# Patient Record
Sex: FEMALE | Race: ASIAN
[De-identification: names, ages, dates, MRNs, and addresses within clinical notes are randomized per-mention and may not be internally consistent; named-entity substitution may affect disease eponyms.]

---

## 2023-01-04 ENCOUNTER — HOSPITAL ENCOUNTER (INPATIENT)
Dept: HOSPITAL 4 - SED | Age: 88
LOS: 2 days | Discharge: NURSING, REHAB OR LONG-TERM CARE | DRG: 640 | End: 2023-01-06
Attending: INTERNAL MEDICINE | Admitting: INTERNAL MEDICINE
Payer: COMMERCIAL

## 2023-01-04 VITALS — HEIGHT: 64 IN | WEIGHT: 104 LBS | BODY MASS INDEX: 17.75 KG/M2

## 2023-01-04 VITALS — SYSTOLIC BLOOD PRESSURE: 168 MMHG

## 2023-01-04 VITALS — SYSTOLIC BLOOD PRESSURE: 177 MMHG

## 2023-01-04 VITALS — SYSTOLIC BLOOD PRESSURE: 160 MMHG

## 2023-01-04 DIAGNOSIS — D63.8: ICD-10-CM

## 2023-01-04 DIAGNOSIS — Z79.82: ICD-10-CM

## 2023-01-04 DIAGNOSIS — E11.51: ICD-10-CM

## 2023-01-04 DIAGNOSIS — E03.9: ICD-10-CM

## 2023-01-04 DIAGNOSIS — I10: ICD-10-CM

## 2023-01-04 DIAGNOSIS — E11.42: ICD-10-CM

## 2023-01-04 DIAGNOSIS — E78.00: ICD-10-CM

## 2023-01-04 DIAGNOSIS — M47.816: ICD-10-CM

## 2023-01-04 DIAGNOSIS — K80.20: ICD-10-CM

## 2023-01-04 DIAGNOSIS — N39.0: ICD-10-CM

## 2023-01-04 DIAGNOSIS — D64.9: ICD-10-CM

## 2023-01-04 DIAGNOSIS — N17.0: ICD-10-CM

## 2023-01-04 DIAGNOSIS — G93.41: ICD-10-CM

## 2023-01-04 DIAGNOSIS — M19.90: ICD-10-CM

## 2023-01-04 DIAGNOSIS — K21.9: ICD-10-CM

## 2023-01-04 DIAGNOSIS — E86.0: Primary | ICD-10-CM

## 2023-01-04 DIAGNOSIS — E87.1: ICD-10-CM

## 2023-01-04 DIAGNOSIS — Z79.1: ICD-10-CM

## 2023-01-04 DIAGNOSIS — R26.81: ICD-10-CM

## 2023-01-04 DIAGNOSIS — I25.10: ICD-10-CM

## 2023-01-04 DIAGNOSIS — Z20.822: ICD-10-CM

## 2023-01-04 DIAGNOSIS — Z79.899: ICD-10-CM

## 2023-01-04 DIAGNOSIS — E44.0: ICD-10-CM

## 2023-01-04 LAB
ALBUMIN SERPL BCP-MCNC: 2.8 G/DL (ref 3.4–4.8)
ALT SERPL W P-5'-P-CCNC: 121 U/L (ref 12–78)
ANION GAP SERPL CALCULATED.3IONS-SCNC: 9 MMOL/L (ref 5–15)
APPEARANCE UR: (no result)
AST SERPL W P-5'-P-CCNC: 62 U/L (ref 10–37)
BACTERIA URNS QL MICRO: (no result) /HPF
BASOPHILS # BLD AUTO: 0.1 K/UL (ref 0–0.2)
BASOPHILS NFR BLD AUTO: 1.2 % (ref 0–2)
BILIRUB SERPL-MCNC: 0.3 MG/DL (ref 0–1)
BILIRUB UR QL STRIP: NEGATIVE
BUN SERPL-MCNC: 26 MG/DL (ref 8–21)
CALCIUM SERPL-MCNC: 8.7 MG/DL (ref 8.4–11)
CHLORIDE SERPL-SCNC: 97 MMOL/L (ref 98–107)
COLOR UR: YELLOW
CREAT SERPL-MCNC: 1.21 MG/DL (ref 0.55–1.3)
EOSINOPHIL # BLD AUTO: 0.1 K/UL (ref 0–0.4)
EOSINOPHIL NFR BLD AUTO: 0.9 % (ref 0–4)
ERYTHROCYTE [DISTWIDTH] IN BLOOD BY AUTOMATED COUNT: 16.6 % (ref 9–15)
GFR SERPL CREATININE-BSD FRML MDRD: (no result) ML/MIN (ref 90–?)
GLUCOSE SERPL-MCNC: 154 MG/DL (ref 70–99)
GLUCOSE UR STRIP-MCNC: NEGATIVE MG/DL
HCT VFR BLD AUTO: 33.9 % (ref 36–48)
HGB BLD-MCNC: 11.4 G/DL (ref 12–16)
HGB UR QL STRIP: (no result)
KETONES UR STRIP-MCNC: NEGATIVE MG/DL
LEUKOCYTE ESTERASE UR QL STRIP: (no result)
LYMPHOCYTES # BLD AUTO: 2.6 K/UL (ref 1–5.5)
LYMPHOCYTES NFR BLD AUTO: 26.6 % (ref 20.5–51.5)
MCH RBC QN AUTO: 31 PG (ref 27–31)
MCHC RBC AUTO-ENTMCNC: 34 % (ref 32–36)
MCV RBC AUTO: 92 FL (ref 79–98)
MONOCYTES # BLD MANUAL: 0.8 K/UL (ref 0–1)
MONOCYTES # BLD MANUAL: 8.3 % (ref 1.7–9.3)
NEUTROPHILS # BLD AUTO: 6.3 K/UL (ref 1.8–7.7)
NEUTROPHILS NFR BLD AUTO: 63 % (ref 40–70)
NITRITE UR QL STRIP: NEGATIVE
PH UR STRIP: 6 [PH] (ref 5–8)
PLATELET # BLD AUTO: 323 K/UL (ref 130–430)
PROT UR QL STRIP: (no result)
RBC # BLD AUTO: 3.69 MIL/UL (ref 4.2–6.2)
RBC #/AREA URNS HPF: (no result) /HPF (ref 0–3)
SP GR UR STRIP: 1.02 (ref 1–1.03)
UROBILINOGEN UR STRIP-MCNC: 0.2 MG/DL (ref 0.2–1)
WBC # BLD AUTO: 9.9 K/UL (ref 4.8–10.8)
WBC #/AREA URNS HPF: >100 /HPF (ref 0–3)

## 2023-01-04 RX ADMIN — Medication SCH TAB: at 23:46

## 2023-01-04 RX ADMIN — DOCUSATE SODIUM SCH MG: 100 CAPSULE, LIQUID FILLED ORAL at 23:44

## 2023-01-04 RX ADMIN — Medication SCH EA: at 21:00

## 2023-01-04 RX ADMIN — DEXTROSE MONOHYDRATE SCH MLS/HR: 50 INJECTION, SOLUTION INTRAVENOUS at 23:45

## 2023-01-04 RX ADMIN — SODIUM CHLORIDE SCH MLS/HR: 9 INJECTION, SOLUTION INTRAVENOUS at 23:45

## 2023-01-04 RX ADMIN — ATORVASTATIN CALCIUM SCH MG: 20 TABLET, FILM COATED ORAL at 23:44

## 2023-01-04 RX ADMIN — Medication SCH EA: at 23:51

## 2023-01-04 NOTE — NUR
RECEIVED PT FROM [facility]. ASSUMED CARE. PT IS AAOX1-2. NORMAL S1S2 NOTED. ON 
R/A. ABDOMEN SOFT, NONTENDER, NONDISTENED. DENIES N/V. INCONTINENT 
BLADDER/BOWEL. BEDBOUND. IV CATH TO RFA 20G S/L. DENIES PAIN.

## 2023-01-04 NOTE — NUR
PT BIB[facility] W C/O OF GENERALIZED WEAKNESS AND BILAT LOWER EXTREM NUMBNESS. 
PT IS NON AMBULATORY BUT ABLE TO ASSIST WITH REPOSITIONING. PT USES BED PAN. PT 
IS LEGALLY BLIND. A/O X 3

## 2023-01-04 NOTE — NUR
Admit bed requested 



Patient will be admitted to care of Dr. ROBERTS.  

Admitted to MED SURG unit.

Diagnosis DEHYDRATION & UTI

Inpatient (Yes or No)  YES

Observation (Yes or No) NO

Orientation concerns or request close to nursing station  (Yes or No) NO

Covid Status NEG

On vent or bipap NO

Isolation requirements NO

Needs a sitter NO

From Home (Yes or if No enter name of facility) NO

Requires Dialysis (Yes or No) NO 

Med Rec Completed (Yes of No) YES

## 2023-01-04 NOTE — NUR
GLUCOSE 168, NO INSULIN GIVEN, PATIENT REFUSE SNACK THIS LATE, WILL RECHECK BLOOD SUGAR IN 
AM PER ORDERS

## 2023-01-04 NOTE — NUR
Medication reconciliation completed with information provided by [facility]. Any prior medication reconciliation on file was reviewed and 
corrected.

## 2023-01-04 NOTE — NUR
ADMISSION NOTE

Received patient from ER via gurney. Patient admitted with diagnosis of dehydration/UTI. 
Patient legally blind is awake, alert, oriented X 4.Respiration even and unlabored, no signs 
of distress. IV saline lock to right hand patent Patient oriented to hospital room, Call 
light within reached, bed in low position,will continue to monitor.

## 2023-01-04 NOTE — NUR
# 20 gauge angiocath placed to R FA.  Use of asceptic technique.  Opsite placed 
over site.  Blood return noted.  Blood for lab drawn from site.  Flushed with 
10 cc of normal saline.  No evidence of infiltration noted.  Patient tolerated 
well.

## 2023-01-04 NOTE — NUR
Patient will be admitted to care of CHEN ROSE.  Admitted to [facility] unit.  Will go 
to room 132C.  Belongings list completed.  Complete and up to date summary 
report printed. SBAR report to be given at bedside with opportunity for 
questions.

## 2023-01-04 NOTE — NUR
ASSESSMENT COMPLETE, NO DISTRESS NOTED, COMFORT MAINTAINED, TOTAL CARE WITH ADL, PATIENT 
LEGALLY BLIND

## 2023-01-05 VITALS — SYSTOLIC BLOOD PRESSURE: 184 MMHG

## 2023-01-05 VITALS — SYSTOLIC BLOOD PRESSURE: 166 MMHG

## 2023-01-05 VITALS — SYSTOLIC BLOOD PRESSURE: 161 MMHG

## 2023-01-05 VITALS — SYSTOLIC BLOOD PRESSURE: 189 MMHG

## 2023-01-05 VITALS — SYSTOLIC BLOOD PRESSURE: 187 MMHG

## 2023-01-05 LAB
ALBUMIN SERPL BCP-MCNC: 2.7 G/DL (ref 3.4–4.8)
ALT SERPL W P-5'-P-CCNC: 130 U/L (ref 12–78)
ANION GAP SERPL CALCULATED.3IONS-SCNC: 9 MMOL/L (ref 5–15)
AST SERPL W P-5'-P-CCNC: 70 U/L (ref 10–37)
BASOPHILS # BLD AUTO: 0.1 K/UL (ref 0–0.2)
BASOPHILS NFR BLD AUTO: 0.7 % (ref 0–2)
BILIRUB SERPL-MCNC: 0.2 MG/DL (ref 0–1)
BUN SERPL-MCNC: 16 MG/DL (ref 8–21)
CALCIUM SERPL-MCNC: 8.4 MG/DL (ref 8.4–11)
CHLORIDE SERPL-SCNC: 94 MMOL/L (ref 98–107)
CREAT SERPL-MCNC: 0.76 MG/DL (ref 0.55–1.3)
EOSINOPHIL # BLD AUTO: 0.1 K/UL (ref 0–0.4)
EOSINOPHIL NFR BLD AUTO: 0.7 % (ref 0–4)
ERYTHROCYTE [DISTWIDTH] IN BLOOD BY AUTOMATED COUNT: 16.6 % (ref 9–15)
GFR SERPL CREATININE-BSD FRML MDRD: (no result) ML/MIN (ref 90–?)
GLUCOSE SERPL-MCNC: 167 MG/DL (ref 70–99)
HCT VFR BLD AUTO: 35.2 % (ref 36–48)
HGB BLD-MCNC: 11.9 G/DL (ref 12–16)
LYMPHOCYTES # BLD AUTO: 2.1 K/UL (ref 1–5.5)
LYMPHOCYTES NFR BLD AUTO: 23.8 % (ref 20.5–51.5)
MCH RBC QN AUTO: 31 PG (ref 27–31)
MCHC RBC AUTO-ENTMCNC: 34 % (ref 32–36)
MCV RBC AUTO: 91 FL (ref 79–98)
MONOCYTES # BLD MANUAL: 0.7 K/UL (ref 0–1)
MONOCYTES # BLD MANUAL: 8.2 % (ref 1.7–9.3)
NEUTROPHILS # BLD AUTO: 6 K/UL (ref 1.8–7.7)
NEUTROPHILS NFR BLD AUTO: 66.6 % (ref 40–70)
PLATELET # BLD AUTO: 311 K/UL (ref 130–430)
RBC # BLD AUTO: 3.86 MIL/UL (ref 4.2–6.2)
WBC # BLD AUTO: 9 K/UL (ref 4.8–10.8)

## 2023-01-05 RX ADMIN — DOCUSATE SODIUM SCH MG: 100 CAPSULE, LIQUID FILLED ORAL at 21:00

## 2023-01-05 RX ADMIN — LEVOTHYROXINE SODIUM SCH MG: 100 TABLET ORAL at 06:07

## 2023-01-05 RX ADMIN — Medication SCH MG: at 21:00

## 2023-01-05 RX ADMIN — SODIUM CHLORIDE SCH MLS/HR: 9 INJECTION, SOLUTION INTRAVENOUS at 12:33

## 2023-01-05 RX ADMIN — TIMOLOL MALEATE SCH ML: 5 SOLUTION/ DROPS OPHTHALMIC at 22:27

## 2023-01-05 RX ADMIN — BRIMONIDINE TARTRATE SCH ML: 2 SOLUTION/ DROPS OPHTHALMIC at 09:17

## 2023-01-05 RX ADMIN — ATORVASTATIN CALCIUM SCH MG: 20 TABLET, FILM COATED ORAL at 21:00

## 2023-01-05 RX ADMIN — GLYCERIN SCH ML: .002; .002; .01 SOLUTION/ DROPS OPHTHALMIC at 22:26

## 2023-01-05 RX ADMIN — Medication SCH EA: at 06:07

## 2023-01-05 RX ADMIN — Medication SCH MG: at 09:02

## 2023-01-05 RX ADMIN — GLYCERIN SCH ML: .002; .002; .01 SOLUTION/ DROPS OPHTHALMIC at 09:13

## 2023-01-05 RX ADMIN — Medication SCH MG: at 09:01

## 2023-01-05 RX ADMIN — HUMAN INSULIN PRN UNITS: 100 INJECTION, SOLUTION SUBCUTANEOUS at 12:26

## 2023-01-05 RX ADMIN — SODIUM CHLORIDE SCH MLS/HR: 9 INJECTION, SOLUTION INTRAVENOUS at 00:30

## 2023-01-05 RX ADMIN — GLYCERIN SCH ML: .002; .002; .01 SOLUTION/ DROPS OPHTHALMIC at 15:00

## 2023-01-05 RX ADMIN — Medication SCH TAB: at 21:00

## 2023-01-05 RX ADMIN — DOCUSATE SODIUM SCH MG: 100 CAPSULE, LIQUID FILLED ORAL at 09:02

## 2023-01-05 RX ADMIN — HUMAN INSULIN PRN UNITS: 100 INJECTION, SOLUTION SUBCUTANEOUS at 06:16

## 2023-01-05 RX ADMIN — HUMAN INSULIN PRN UNITS: 100 INJECTION, SOLUTION SUBCUTANEOUS at 17:01

## 2023-01-05 RX ADMIN — Medication SCH MG: at 15:00

## 2023-01-05 RX ADMIN — SODIUM CHLORIDE SCH MLS/HR: 9 INJECTION, SOLUTION INTRAVENOUS at 22:19

## 2023-01-05 RX ADMIN — AMIODARONE HYDROCHLORIDE SCH MG: 200 TABLET ORAL at 09:03

## 2023-01-05 RX ADMIN — BRIMONIDINE TARTRATE SCH ML: 2 SOLUTION/ DROPS OPHTHALMIC at 22:27

## 2023-01-05 RX ADMIN — DEXTROSE MONOHYDRATE SCH MLS/HR: 50 INJECTION, SOLUTION INTRAVENOUS at 22:24

## 2023-01-05 RX ADMIN — Medication SCH EA: at 22:30

## 2023-01-05 RX ADMIN — Medication SCH EA: at 12:21

## 2023-01-05 RX ADMIN — Medication SCH EA: at 16:59

## 2023-01-05 RX ADMIN — Medication SCH TAB: at 09:02

## 2023-01-05 RX ADMIN — TIMOLOL MALEATE SCH ML: 5 SOLUTION/ DROPS OPHTHALMIC at 09:15

## 2023-01-05 NOTE — NUR
PER TECH MIDNIGHT VITAL SIGNS B/P 184/98, P80, PAGED DR ROBERTS, NO RETURN CALL, RECHECK B/P 
AT 0115 161/85 P73

## 2023-01-05 NOTE — NUR
REPORT RECEIVED, NO DISTRESS NOTED, COMFORT MAINTAINED, PATIENT ANXIOUS AT TIMES, PATIENT A 
FEEDER FOR ALL MEALS, CNA TO ASSIST WITH DINNER, PATIENT LEGALLY BLIND AND HAS APPROPRIATE 
CALL LIGHT WITHIN REACH

## 2023-01-05 NOTE — NUR
Received patient wake in bed alert oriented x.2. Respiration even and unlabored, no signs of 
distress. Patient reposition, IV infusing to right forearm patent. All safety precaution 
secure , bed in low position call light w/in reached

## 2023-01-06 VITALS — SYSTOLIC BLOOD PRESSURE: 140 MMHG

## 2023-01-06 VITALS — SYSTOLIC BLOOD PRESSURE: 142 MMHG

## 2023-01-06 VITALS — SYSTOLIC BLOOD PRESSURE: 138 MMHG

## 2023-01-06 VITALS — SYSTOLIC BLOOD PRESSURE: 158 MMHG

## 2023-01-06 LAB
ANION GAP SERPL CALCULATED.3IONS-SCNC: 10 MMOL/L (ref 5–15)
BASOPHILS # BLD AUTO: 0.1 K/UL (ref 0–0.2)
BASOPHILS NFR BLD AUTO: 0.7 % (ref 0–2)
BUN SERPL-MCNC: 15 MG/DL (ref 8–21)
CALCIUM SERPL-MCNC: 8 MG/DL (ref 8.4–11)
CHLORIDE SERPL-SCNC: 100 MMOL/L (ref 98–107)
CREAT SERPL-MCNC: 0.88 MG/DL (ref 0.55–1.3)
EOSINOPHIL # BLD AUTO: 0.1 K/UL (ref 0–0.4)
EOSINOPHIL NFR BLD AUTO: 0.9 % (ref 0–4)
ERYTHROCYTE [DISTWIDTH] IN BLOOD BY AUTOMATED COUNT: 15.9 % (ref 9–15)
GFR SERPL CREATININE-BSD FRML MDRD: (no result) ML/MIN (ref 90–?)
GLUCOSE SERPL-MCNC: 158 MG/DL (ref 70–99)
HCT VFR BLD AUTO: 35.5 % (ref 36–48)
HGB BLD-MCNC: 11.9 G/DL (ref 12–16)
LYMPHOCYTES # BLD AUTO: 1.8 K/UL (ref 1–5.5)
LYMPHOCYTES NFR BLD AUTO: 21.4 % (ref 20.5–51.5)
MCH RBC QN AUTO: 31 PG (ref 27–31)
MCHC RBC AUTO-ENTMCNC: 33 % (ref 32–36)
MCV RBC AUTO: 92 FL (ref 79–98)
MONOCYTES # BLD MANUAL: 0.7 K/UL (ref 0–1)
MONOCYTES # BLD MANUAL: 8.2 % (ref 1.7–9.3)
NEUTROPHILS # BLD AUTO: 5.8 K/UL (ref 1.8–7.7)
NEUTROPHILS NFR BLD AUTO: 68.8 % (ref 40–70)
PLATELET # BLD AUTO: 276 K/UL (ref 130–430)
RBC # BLD AUTO: 3.85 MIL/UL (ref 4.2–6.2)
WBC # BLD AUTO: 8.5 K/UL (ref 4.8–10.8)

## 2023-01-06 RX ADMIN — LEVOTHYROXINE SODIUM SCH MG: 100 TABLET ORAL at 07:53

## 2023-01-06 RX ADMIN — Medication SCH MG: at 11:08

## 2023-01-06 RX ADMIN — Medication SCH TAB: at 09:00

## 2023-01-06 RX ADMIN — Medication SCH MG: at 11:07

## 2023-01-06 RX ADMIN — DOCUSATE SODIUM SCH MG: 100 CAPSULE, LIQUID FILLED ORAL at 11:07

## 2023-01-06 RX ADMIN — AMIODARONE HYDROCHLORIDE SCH MG: 200 TABLET ORAL at 11:10

## 2023-01-06 RX ADMIN — Medication SCH EA: at 07:55

## 2023-01-06 NOTE — NUR
Spoke to patient's son, Arun, he agreed for her to return to [facility] today. 
Referral sent to [facility]- [phone]

## 2023-01-06 NOTE — NUR
PATIENT REFUSE ALL INSULIN SLIDING SCALE COVERAGE, UNABLE TO GIVE HOME EYE DROPS, IN-HOUSE 
PHARMACY UNAVAILABLE AFTER 9:30PM, WILL INFORM ON-COMING SHIFT, TOTAL CARE WITH ADL, FEEDER 
FOR ALL MEALS, TURN Q2HRS AND PRN, PATIENT WOULD ONLY TAKE METFORMIN, MELATONIN, AND 
SCHEDULE EYE GTTS, PATIENT TAKES ALL ORAL MEDICATION WITH WARM WATER, PATIENT SLEPT WELL 
DURING SHIFT AFTER MELATONIN GIVEN